# Patient Record
Sex: FEMALE | Race: WHITE | NOT HISPANIC OR LATINO | Employment: OTHER | URBAN - NONMETROPOLITAN AREA
[De-identification: names, ages, dates, MRNs, and addresses within clinical notes are randomized per-mention and may not be internally consistent; named-entity substitution may affect disease eponyms.]

---

## 2021-04-08 DIAGNOSIS — Z23 ENCOUNTER FOR IMMUNIZATION: ICD-10-CM

## 2024-03-18 ENCOUNTER — EVALUATION (OUTPATIENT)
Dept: PHYSICAL THERAPY | Facility: CLINIC | Age: 74
End: 2024-03-18
Payer: MEDICARE

## 2024-03-18 DIAGNOSIS — M54.16 LUMBAR RADICULOPATHY: Primary | ICD-10-CM

## 2024-03-18 DIAGNOSIS — M53.3 SACROILIAC JOINT DYSFUNCTION: ICD-10-CM

## 2024-03-18 PROCEDURE — 97161 PT EVAL LOW COMPLEX 20 MIN: CPT | Performed by: PHYSICAL THERAPIST

## 2024-03-18 PROCEDURE — 97110 THERAPEUTIC EXERCISES: CPT | Performed by: PHYSICAL THERAPIST

## 2024-03-18 NOTE — LETTER
2024    Reed Mckinney MD  [address] [place] [id number]    Patient: Karen Gambino   YOB: 1950   Date of Visit: 3/18/2024     Encounter Diagnosis     ICD-10-CM    1. Lumbar radiculopathy  M54.16       2. Sacroiliac joint dysfunction  M53.3           Dear Dr. Mckinney:    Thank you for your recent referral of Karen Gambino. Please review the attached evaluation summary from Karen's recent visit.     Please verify that you agree with the plan of care by signing the attached order.     If you have any questions or concerns, please do not hesitate to call.     I sincerely appreciate the opportunity to share in the care of one of your patients and hope to have another opportunity to work with you in the near future.       Sincerely,    Tracey Elliott, PT      Referring Provider:      I certify that I have read the below Plan of Care and certify the need for these services furnished under this plan of treatment while under my care.                    Reed Mckinney MD  [address] [place] [id number]  Via Fax: [phone]          PT Evaluation     Today's date: 3/18/2024  Patient name: Karen Gambino  : 1950  MRN: [id number]  Referring provider: Reed Mckinney MD  Dx:   Encounter Diagnosis     ICD-10-CM    1. Lumbar radiculopathy  M54.16       2. Sacroiliac joint dysfunction  M53.3                      Assessment  Assessment details: Patient is a 73 y.o. woman who presents to PT with R LBP that radiates to the level of the knee. Patient reports tenderness with palpation to the right piriformis, SI joint and gluteus medius. Patient is able to ambulate with short step length and limited trunk rotation/ arm swing. Patient has limited trunk flexion ROM. Patient is expected to respond well to PT intervention to decrease pain and improve function.   Impairments: abnormal or restricted ROM, lacks appropriate home exercise program and pain with  function    Symptom irritability: highUnderstanding of Dx/Px/POC: excellent  Goals  STG - 4 weeks  Patient able to demonstrate increased L/S AROM into flexion for pain free ROM.  Patient to report pain at worst 2/10 with daily activity.  Patient to have increased flexibility of right piriformis by 25 %.  Patient to be independent with HEP.    LTG - 8 weeks  Patient able to demonstrate painfree AROM fo the L/S.  Patient able to demonstrate good posture in sitting and standing.  Patient able to demonstrate proper body mechanics for lifting and carrying items.  Patient able to ambulate with normalized gait pattern.   Patient to demonstrate core strength at  4+/5 .  Patient to be independent with final HEP.      Plan  Plan details: Patient's evaluation is completed. Patient was instructed with a HEP this date. Patient has scheduled further PT sessions for treatment.    Patient would benefit from: PT eval and skilled physical therapy  Planned modality interventions: TENS, thermotherapy: hydrocollator packs, electrical stimulation/Russian stimulation and cryotherapy  Planned therapy interventions: manual therapy, neuromuscular re-education, therapeutic exercise, therapeutic training and home exercise program  Frequency: 2x week  Duration in weeks: 2  Plan of Care beginning date: 3/18/2024  Plan of Care expiration date: 5/13/2024  Treatment plan discussed with: patient        Subjective Evaluation    History of Present Illness  Mechanism of injury: Patient notes that she has trouble at the right SI joint/ buttock with radiation/ tingling to the knee. She notes that she has had symptoms for about 2 months. She notes no prior hx of this type of symptoms. She notes she had an injection for tail bone pain that was helpful in November 2023. She notes difficulty laying in bed. Symptoms increase with sitting. She notes that she feels better with standing/ walking.   Patient Goals  Patient goals for therapy: decreased  pain    Pain  Current pain ratin  At best pain ratin  At worst pain ratin  Quality: dull ache  Relieving factors: change in position  Aggravating factors: sitting  Progression: no change    Social Support  Lives in: multiple-level home    Employment status: working ( as needed - sitting/ walking)  Treatments  Previous treatment: physical therapy (for the neck)        Objective     Postural Observations  Seated posture: fair  Standing posture: fair      Palpation   Left   Hypertonic in the erector spinae.   Tenderness of the lumbar paraspinals and quadratus lumborum.     Right   Hypertonic in the erector spinae.   Tenderness of the lumbar paraspinals and quadratus lumborum.     Additional Palpation Details  R and L SI joint are reactive to palpation as well at the R piriformis and Gluteus medius    Neurological Testing     Sensation     Lumbar   Left   Intact: light touch    Right   Intact: light touch    Reflexes   Left   Patellar (L4): normal (2+)    Right   Patellar (L4): normal (2+)    Active Range of Motion     Lumbar   Flexion:  with pain Restriction level: moderate  Extension:  WFL  Left lateral flexion:  with pain Restriction level: minimal  Right lateral flexion:  Restriction level: minimal  Left rotation:  Restriction level: minimal  Right rotation:  Restriction level: minimal    Joint Play     Hypomobile: L3, L4 and L5     Pain: L3, L4 and L5     Strength/Myotome Testing     Left Hip   Planes of Motion   Flexion: 4+  Extension: 4+  Abduction: 4+  Adduction: 4+  External rotation: 4  Internal rotation: 4    Right Hip   Planes of Motion   Flexion: 4-  Extension: 4+  Abduction: 4-  Adduction: 4+  External rotation: 4-  Internal rotation: 4    Left Knee   Flexion: 4+  Extension: 4+    Right Knee   Flexion: 4+  Extension: 4+    Left Ankle/Foot   Dorsiflexion: 4+  Plantar flexion: 4+    Right Ankle/Foot   Dorsiflexion: 4+  Plantar flexion: 4+    Functional Assessment      Squat   "  Trunk lean left.              Precautions: none     Daily Treatment Diary:      Initial Evaluation Date: 03/18/24  Compliance 3/18                     Visit Number 1                    Re-Eval  IE                 MC   Foto Captured Y                           3/18                     Manual                      TPR - R piriformis/ glut med  -->                                                                 Ther-Ex                      Piriformis stretch 30\" x3                     Sktc 10\" x3                     LTR -->                     HS stretch 30\" x3                     TA setting 5\" x10                                           Seated flexion ball roll outs                                                                                        Neuro Re-Ed                                                                                                Ther-Act                                                               Modalities                      HP prone  10 min                                                    Access Code: [email or web address]  URL: [email or web address]  Date: 03/18/2024  Prepared by: Tracey Elliott    Exercises  - Hooklying Single Knee to Chest Stretch  - 1 x daily - 7 x weekly - 1 sets - 3 reps - 10 hold  - Supine Piriformis Stretch with Foot on Ground  - 1 x daily - 7 x weekly - 1 sets - 3 reps - 30 hold  - Seated Hamstring Stretch  - 1 x daily - 7 x weekly - 1 sets - 3 reps - 30 hold  - Supine Transversus Abdominis Bracing - Hands on Ground  - 1 x daily - 7 x weekly - 1 sets - 10 reps - 3-5 hold                         "

## 2024-03-18 NOTE — PROGRESS NOTES
PT Evaluation     Today's date: 3/18/2024  Patient name: Karen Gambino  : 1950  MRN: [id number]  Referring provider: Reed Mckinney MD  Dx:   Encounter Diagnosis     ICD-10-CM    1. Lumbar radiculopathy  M54.16       2. Sacroiliac joint dysfunction  M53.3                      Assessment  Assessment details: Patient is a 73 y.o. woman who presents to PT with R LBP that radiates to the level of the knee. Patient reports tenderness with palpation to the right piriformis, SI joint and gluteus medius. Patient is able to ambulate with short step length and limited trunk rotation/ arm swing. Patient has limited trunk flexion ROM. Patient is expected to respond well to PT intervention to decrease pain and improve function.   Impairments: abnormal or restricted ROM, lacks appropriate home exercise program and pain with function    Symptom irritability: highUnderstanding of Dx/Px/POC: excellent  Goals  STG - 4 weeks  Patient able to demonstrate increased L/S AROM into flexion for pain free ROM.  Patient to report pain at worst 2/10 with daily activity.  Patient to have increased flexibility of right piriformis by 25 %.  Patient to be independent with HEP.    LTG - 8 weeks  Patient able to demonstrate painfree AROM fo the L/S.  Patient able to demonstrate good posture in sitting and standing.  Patient able to demonstrate proper body mechanics for lifting and carrying items.  Patient able to ambulate with normalized gait pattern.   Patient to demonstrate core strength at  4+/5 .  Patient to be independent with final HEP.      Plan  Plan details: Patient's evaluation is completed. Patient was instructed with a HEP this date. Patient has scheduled further PT sessions for treatment.    Patient would benefit from: PT eval and skilled physical therapy  Planned modality interventions: TENS, thermotherapy: hydrocollator packs, electrical stimulation/Russian stimulation and cryotherapy  Planned therapy interventions: manual  therapy, neuromuscular re-education, therapeutic exercise, therapeutic training and home exercise program  Frequency: 2x week  Duration in weeks: 2  Plan of Care beginning date: 3/18/2024  Plan of Care expiration date: 2024  Treatment plan discussed with: patient        Subjective Evaluation    History of Present Illness  Mechanism of injury: Patient notes that she has trouble at the right SI joint/ buttock with radiation/ tingling to the knee. She notes that she has had symptoms for about 2 months. She notes no prior hx of this type of symptoms. She notes she had an injection for tail bone pain that was helpful in 2023. She notes difficulty laying in bed. Symptoms increase with sitting. She notes that she feels better with standing/ walking.   Patient Goals  Patient goals for therapy: decreased pain    Pain  Current pain ratin  At best pain ratin  At worst pain ratin  Quality: dull ache  Relieving factors: change in position  Aggravating factors: sitting  Progression: no change    Social Support  Lives in: multiple-level home    Employment status: working ( as needed - sitting/ walking)  Treatments  Previous treatment: physical therapy (for the neck)        Objective     Postural Observations  Seated posture: fair  Standing posture: fair      Palpation   Left   Hypertonic in the erector spinae.   Tenderness of the lumbar paraspinals and quadratus lumborum.     Right   Hypertonic in the erector spinae.   Tenderness of the lumbar paraspinals and quadratus lumborum.     Additional Palpation Details  R and L SI joint are reactive to palpation as well at the R piriformis and Gluteus medius    Neurological Testing     Sensation     Lumbar   Left   Intact: light touch    Right   Intact: light touch    Reflexes   Left   Patellar (L4): normal (2+)    Right   Patellar (L4): normal (2+)    Active Range of Motion     Lumbar   Flexion:  with pain Restriction level: moderate  Extension:   "WFL  Left lateral flexion:  with pain Restriction level: minimal  Right lateral flexion:  Restriction level: minimal  Left rotation:  Restriction level: minimal  Right rotation:  Restriction level: minimal    Joint Play     Hypomobile: L3, L4 and L5     Pain: L3, L4 and L5     Strength/Myotome Testing     Left Hip   Planes of Motion   Flexion: 4+  Extension: 4+  Abduction: 4+  Adduction: 4+  External rotation: 4  Internal rotation: 4    Right Hip   Planes of Motion   Flexion: 4-  Extension: 4+  Abduction: 4-  Adduction: 4+  External rotation: 4-  Internal rotation: 4    Left Knee   Flexion: 4+  Extension: 4+    Right Knee   Flexion: 4+  Extension: 4+    Left Ankle/Foot   Dorsiflexion: 4+  Plantar flexion: 4+    Right Ankle/Foot   Dorsiflexion: 4+  Plantar flexion: 4+    Functional Assessment      Squat    Trunk lean left.              Precautions: none     Daily Treatment Diary:      Initial Evaluation Date: 03/18/24  Compliance 3/18                     Visit Number 1                    Re-Eval  IE                    Foto Captured Y                           3/18                     Manual                      TPR - R piriformis/ glut med  -->                                                                 Ther-Ex                      Piriformis stretch 30\" x3                     Sktc 10\" x3                     LTR -->                     HS stretch 30\" x3                     TA setting 5\" x10                                           Seated flexion ball roll outs                                                                                        Neuro Re-Ed                                                                                                Ther-Act                                                               Modalities                      HP prone  10 min                                                    Access Code: [email or web address]  URL: [email or web address]  Date: 03/18/2024  Prepared " by: Tracey Elliott    Exercises  - Hooklying Single Knee to Chest Stretch  - 1 x daily - 7 x weekly - 1 sets - 3 reps - 10 hold  - Supine Piriformis Stretch with Foot on Ground  - 1 x daily - 7 x weekly - 1 sets - 3 reps - 30 hold  - Seated Hamstring Stretch  - 1 x daily - 7 x weekly - 1 sets - 3 reps - 30 hold  - Supine Transversus Abdominis Bracing - Hands on Ground  - 1 x daily - 7 x weekly - 1 sets - 10 reps - 3-5 hold

## 2024-03-18 NOTE — LETTER
2024    Reed Mckinney MD  [address] [place] [address]    Patient: Karen Gambino   YOB: 1950   Date of Visit: 3/18/2024     Encounter Diagnosis     ICD-10-CM    1. Lumbar radiculopathy  M54.16       2. Sacroiliac joint dysfunction  M53.3           Dear Dr. Mckinney:    Thank you for your recent referral of Karen Gambino. Please review the attached evaluation summary from Karen's recent visit.     Please verify that you agree with the plan of care by signing the attached order.     If you have any questions or concerns, please do not hesitate to call.     I sincerely appreciate the opportunity to share in the care of one of your patients and hope to have another opportunity to work with you in the near future.       Sincerely,    Tracey Elliott, PT      Referring Provider:      I certify that I have read the below Plan of Care and certify the need for these services furnished under this plan of treatment while under my care.                    Reed Mckinney MD  [address] [place] [address]  Via Fax: [phone]          PT Evaluation     Today's date: 3/18/2024  Patient name: Karen Gambino  : 1950  MRN: [id number]  Referring provider: Reed Mckinney MD  Dx: No diagnosis found.               Assessment/Plan    Subjective Evaluation    History of Present Illness  Mechanism of injury: Patient notes that she has trouble at the right SI joint/ buttock with radiation/ tingling to the knee. She notes that she has had symptoms for about 2 months. She notes no prior hx of this type of symptoms. She notes she had an injection for tail bone pain that was helpful in 2023. She notes difficulty laying in bed. Symptoms increase with sitting. She notes that she feels better with standing/ walking.   Patient Goals  Patient goals for therapy: decreased pain    Pain  Current pain ratin  At best pain ratin  At worst pain ratin  Quality:  dull ache  Relieving factors: change in position  Aggravating factors: sitting  Progression: no change    Social Support  Lives in: multiple-level home    Employment status: working ( as needed - sitting/ walking)  Treatments  Previous treatment: physical therapy (for the neck)      Objective           Precautions: ***     Daily Treatment Diary:      Initial Evaluation Date: 03/18/24  Compliance ***                     Visit Number                     Re-Eval  IE                    Foto Captured                            ***                     Manual                                                                                        Ther-Ex                                                                                                                                                                                                                                                  Neuro Re-Ed                                                                                                Ther-Act                                                               Modalities                                                                           Access Code: [email or web address]  URL: [email or web address]  Date: 03/18/2024  Prepared by: Tracey Elliott    Exercises  - Hooklying Single Knee to Chest Stretch  - 1 x daily - 7 x weekly - 1 sets - 3 reps - 10 hold  - Supine Piriformis Stretch with Foot on Ground  - 1 x daily - 7 x weekly - 1 sets - 3 reps - 30 hold  - Seated Hamstring Stretch  - 1 x daily - 7 x weekly - 1 sets - 3 reps - 30 hold  - Supine Transversus Abdominis Bracing - Hands on Ground  - 1 x daily - 7 x weekly - 1 sets - 10 reps - 3-5 hold

## 2024-03-21 ENCOUNTER — OFFICE VISIT (OUTPATIENT)
Dept: PHYSICAL THERAPY | Facility: CLINIC | Age: 74
End: 2024-03-21
Payer: MEDICARE

## 2024-03-21 DIAGNOSIS — M54.16 LUMBAR RADICULOPATHY: Primary | ICD-10-CM

## 2024-03-21 DIAGNOSIS — M53.3 SACROILIAC JOINT DYSFUNCTION: ICD-10-CM

## 2024-03-21 PROCEDURE — 97110 THERAPEUTIC EXERCISES: CPT

## 2024-03-21 PROCEDURE — 97112 NEUROMUSCULAR REEDUCATION: CPT

## 2024-03-21 PROCEDURE — 97140 MANUAL THERAPY 1/> REGIONS: CPT

## 2024-03-21 NOTE — PROGRESS NOTES
"Daily Note     Today's date: 3/21/2024  Patient name: Karen Gambino  : 1950  MRN: [id number]  Referring provider: Reed Mckinney MD  Dx:   Encounter Diagnosis     ICD-10-CM    1. Lumbar radiculopathy  M54.16       2. Sacroiliac joint dysfunction  M53.3                      Subjective: Patient reports R sided low back and LE symptoms remain about the same. She says she been complaint with HEP.      Objective: See treatment diary below      Assessment: Tolerated treatment well without complaint. Patient required moderate verbal cues to perform exercises with appropriate technique and intensity. Patient would benefit from continued PT to increase trunk mobility and core strength for improved function in daily activities.       Plan: Continue per plan of care.      Precautions: none     Daily Treatment Diary:      Initial Evaluation Date: 24  Compliance 3/18  3/21                   Visit Number 1 2                   Re-Eval  IE                    Foto Captured Y                           3/18  3/21                   Manual                      TPR - R piriformis/ glut med  -->  15 min                                                               Ther-Ex                      Piriformis stretch 30\" x3 30\"x3                   Sktc 10\" x3 10\"x5                   LTR --> 5\"x10                   HS stretch 30\" x3  30\"x3 strap                                                               Seated flexion ball roll outs- 3 dir   5\"x5 ea                                                                                     Neuro Re-Ed                      TA setting 5\"x10  5\"x10                   TA c hip abd  5\"x10 OTB           TA c hip add  5\"x10           TA c march  10x                        Ther-Act                                                               Modalities                      HP prone  10 min 10 min pre                                                   Access Code: [email or web address]  URL: " [email or web address]  Date: 03/18/2024  Prepared by: Tracey Elliott    Exercises  - Hooklying Single Knee to Chest Stretch  - 1 x daily - 7 x weekly - 1 sets - 3 reps - 10 hold  - Supine Piriformis Stretch with Foot on Ground  - 1 x daily - 7 x weekly - 1 sets - 3 reps - 30 hold  - Seated Hamstring Stretch  - 1 x daily - 7 x weekly - 1 sets - 3 reps - 30 hold  - Supine Transversus Abdominis Bracing - Hands on Ground  - 1 x daily - 7 x weekly - 1 sets - 10 reps - 3-5 hold

## 2024-03-25 ENCOUNTER — OFFICE VISIT (OUTPATIENT)
Dept: PHYSICAL THERAPY | Facility: CLINIC | Age: 74
End: 2024-03-25
Payer: MEDICARE

## 2024-03-25 DIAGNOSIS — M54.16 LUMBAR RADICULOPATHY: Primary | ICD-10-CM

## 2024-03-25 DIAGNOSIS — M53.3 SACROILIAC JOINT DYSFUNCTION: ICD-10-CM

## 2024-03-25 PROCEDURE — 97140 MANUAL THERAPY 1/> REGIONS: CPT

## 2024-03-25 PROCEDURE — 97110 THERAPEUTIC EXERCISES: CPT

## 2024-03-25 PROCEDURE — 97112 NEUROMUSCULAR REEDUCATION: CPT

## 2024-03-25 NOTE — PROGRESS NOTES
"Daily Note     Today's date: 3/25/2024  Patient name: Karen Gambino  : 1950  MRN: [id number]  Referring provider: Reed Mckinney MD  Dx:   Encounter Diagnosis     ICD-10-CM    1. Lumbar radiculopathy  M54.16       2. Sacroiliac joint dysfunction  M53.3                      Subjective: She reports some pain and stiffness this weekend with the long drive to her daughter's house. Notes she felt good after PT with some mild soreness.      Objective: See treatment diary below      Assessment: Tolerated treatment well without complaint. Moderate R sided L/S paraspinal and glute hypertonicity noted with STM. Patient would benefit from continued PT to increase trunk mobility and core strength for improved function in ADLs.      Plan: Continue per plan of care.      Precautions: none     Daily Treatment Diary:      Initial Evaluation Date: 24  Compliance 3/18  3/21  3/25                 Visit Number 1 2  3                 Re-Eval  IE                 MC   Foto Captured Y                           3/18  3/21  3/25                 Manual                      TPR - R piriformis/ glut med  -->  15 min  15 min                                                             Ther-Ex                      Warm up   Heat below          Piriformis stretch 30\" x3 30\"x3  30\"x3                 Sktc 10\" x3 10\"x5  10\"x5                 LTR --> 5\"x10  5\"x10                 HS stretch 30\" x3  30\"x3 strap  30\"x3 strap                                                             Seated flexion ball roll outs- 3 dir   5\"x5 ea  5\"x5 ea                                                                                   Neuro Re-Ed                      TA setting 5\"x10  5\"x10  5\"x10                 TA c hip abd  5\"x10 OTB 5\"x15 GTB          TA c hip add  5\"x10 5\"x15          TA c march  10x 15x                       Ther-Act                                                               Modalities                      HP prone  10 min 10 min " pre 10 min pre                                                  Access Code: [email or web address]  URL: [email or web address]  Date: 03/18/2024  Prepared by: Tracey Elliott    Exercises  - Hooklying Single Knee to Chest Stretch  - 1 x daily - 7 x weekly - 1 sets - 3 reps - 10 hold  - Supine Piriformis Stretch with Foot on Ground  - 1 x daily - 7 x weekly - 1 sets - 3 reps - 30 hold  - Seated Hamstring Stretch  - 1 x daily - 7 x weekly - 1 sets - 3 reps - 30 hold  - Supine Transversus Abdominis Bracing - Hands on Ground  - 1 x daily - 7 x weekly - 1 sets - 10 reps - 3-5 hold

## 2024-03-28 ENCOUNTER — OFFICE VISIT (OUTPATIENT)
Dept: PHYSICAL THERAPY | Facility: CLINIC | Age: 74
End: 2024-03-28
Payer: MEDICARE

## 2024-03-28 DIAGNOSIS — M53.3 SACROILIAC JOINT DYSFUNCTION: ICD-10-CM

## 2024-03-28 DIAGNOSIS — M54.16 LUMBAR RADICULOPATHY: Primary | ICD-10-CM

## 2024-03-28 PROCEDURE — 97140 MANUAL THERAPY 1/> REGIONS: CPT

## 2024-03-28 PROCEDURE — 97110 THERAPEUTIC EXERCISES: CPT

## 2024-03-28 PROCEDURE — 97112 NEUROMUSCULAR REEDUCATION: CPT

## 2024-03-28 NOTE — PROGRESS NOTES
"Daily Note     Today's date: 3/28/2024  Patient name: Karen Gambino  : 1950  MRN: [id number]  Referring provider: Reed Mckinney MD  Dx:   Encounter Diagnosis     ICD-10-CM    1. Lumbar radiculopathy  M54.16       2. Sacroiliac joint dysfunction  M53.3                      Subjective: Patient reports low back is not as sore today as previously.       Objective: See treatment diary below      Assessment: Tolerated treatment well without complaint. Patient demonstrates better understanding of core engagement work. Patient would benefit from continued PT to increase to increase trunk mobility and core strength for improved function in daily activities.      Plan: Continue per plan of care.      Precautions: none     Daily Treatment Diary:      Initial Evaluation Date: 24  Compliance 3/18  3/21  3/25  3/28               Visit Number 1 2  3  4               Re-Eval  IE                 MC   Foto Captured Y                           3/18  3/21  3/25  3/28               Manual                      TPR - R piriformis/ glut med  -->  15 min  15 min  12 min                                                           Ther-Ex                      Warm up   Heat below          Piriformis stretch 30\" x3 30\"x3  30\"x3  30\"x3               Sktc 10\" x3 10\"x5  10\"x5  10\"x5               LTR --> 5\"x10  5\"x10  5\"X10               HS stretch 30\" x3  30\"x3 strap  30\"x3 strap  30\"x3 strap                                                           Seated flexion ball roll outs- 3 dir   5\"x5 ea  5\"x5 ea  5\"x10 ea                                     NuStep      L1 5 min                                     Neuro Re-Ed                      TA setting 5\"x10  5\"x10  5\"x10  5\"x15               TA c hip abd  5\"x10 OTB 5\"x15 GTB 5\"X15 GTB         TA c hip add  5\"x10 5\"x15 5\"x15         TA c  15x 15x                      Ther-Act                                                               Modalities                      HP " prone  10 min 10 min pre 10 min pre 10 min post                                                 Access Code: [email or web address]  URL: [email or web address]  Date: 03/18/2024  Prepared by: Tracey Elliott    Exercises  - Hooklying Single Knee to Chest Stretch  - 1 x daily - 7 x weekly - 1 sets - 3 reps - 10 hold  - Supine Piriformis Stretch with Foot on Ground  - 1 x daily - 7 x weekly - 1 sets - 3 reps - 30 hold  - Seated Hamstring Stretch  - 1 x daily - 7 x weekly - 1 sets - 3 reps - 30 hold  - Supine Transversus Abdominis Bracing - Hands on Ground  - 1 x daily - 7 x weekly - 1 sets - 10 reps - 3-5 hold

## 2024-04-05 ENCOUNTER — OFFICE VISIT (OUTPATIENT)
Dept: PHYSICAL THERAPY | Facility: CLINIC | Age: 74
End: 2024-04-05
Payer: MEDICARE

## 2024-04-05 DIAGNOSIS — M53.3 SACROILIAC JOINT DYSFUNCTION: ICD-10-CM

## 2024-04-05 DIAGNOSIS — M54.16 LUMBAR RADICULOPATHY: Primary | ICD-10-CM

## 2024-04-05 PROCEDURE — 97110 THERAPEUTIC EXERCISES: CPT

## 2024-04-05 PROCEDURE — 97140 MANUAL THERAPY 1/> REGIONS: CPT

## 2024-04-05 NOTE — PROGRESS NOTES
"Daily Note     Today's date: 2024  Patient name: Karen Gambino  : 1950  MRN: [id number]  Referring provider: Reed Mckinney MD  Dx:   Encounter Diagnosis     ICD-10-CM    1. Lumbar radiculopathy  M54.16       2. Sacroiliac joint dysfunction  M53.3                      Subjective: Pt reports continued tenderness in her R posterior hip glue/piriformis area.      Objective: See treatment diary below      Assessment: Tolerated treatment well. Patient exhibited good technique with therapeutic exercises. She had more trigger point tenderness in the R piriformis compared to glute med today.        Plan: Continue per plan of care.      Precautions: none     Daily Treatment Diary:      Initial Evaluation Date: 24  Compliance 3/18  3/21  3/25  3/28  4/5/24             Visit Number 1 2  3  4  5             Re-Eval  IE                 MC   Foto Captured Y                           3/18  3/21  3/25  3/28  4/5/24             Manual                      TPR - R piriformis/ glut med  -->  15 min  15 min  12 min  15 min                                                         Ther-Ex                      Warm up   Heat below          Piriformis stretch 30\" x3 30\"x3  30\"x3  30\"x3  30\"x3             Sktc 10\" x3 10\"x5  10\"x5  10\"x5  10\"x5             LTR --> 5\"x10  5\"x10  5\"X10  5\"10             HS stretch 30\" x3  30\"x3 strap  30\"x3 strap  30\"x3 strap  30'x3 strap                                                         Seated flexion ball roll outs- 3 dir   5\"x5 ea  5\"x5 ea  5\"x10 ea  5\"x10                                   NuStep      L1 5 min  L1 5 min                                   Neuro Re-Ed                      TA setting 5\"x10  5\"x10  5\"x10  5\"x15  5\"x15             TA c hip abd  5\"x10 OTB 5\"x15 GTB 5\"X15 GTB 5\"x15 GTB        TA c hip add  5\"x10 5\"x15 5\"x15 5\"x15        TA c march  10x 15x 15x 15x                     Ther-Act                                                               Modalities            "           HP prone  10 min 10 min pre 10 min pre 10 min post 10 min pre                                                Access Code: [email or web address]  URL: [email or web address]  Date: 03/18/2024  Prepared by: Tracey Elliott    Exercises  - Hooklying Single Knee to Chest Stretch  - 1 x daily - 7 x weekly - 1 sets - 3 reps - 10 hold  - Supine Piriformis Stretch with Foot on Ground  - 1 x daily - 7 x weekly - 1 sets - 3 reps - 30 hold  - Seated Hamstring Stretch  - 1 x daily - 7 x weekly - 1 sets - 3 reps - 30 hold  - Supine Transversus Abdominis Bracing - Hands on Ground  - 1 x daily - 7 x weekly - 1 sets - 10 reps - 3-5 hold

## 2024-04-08 ENCOUNTER — OFFICE VISIT (OUTPATIENT)
Dept: PHYSICAL THERAPY | Facility: CLINIC | Age: 74
End: 2024-04-08
Payer: MEDICARE

## 2024-04-08 DIAGNOSIS — M53.3 SACROILIAC JOINT DYSFUNCTION: ICD-10-CM

## 2024-04-08 DIAGNOSIS — M54.16 LUMBAR RADICULOPATHY: Primary | ICD-10-CM

## 2024-04-08 PROCEDURE — 97140 MANUAL THERAPY 1/> REGIONS: CPT

## 2024-04-08 PROCEDURE — 97110 THERAPEUTIC EXERCISES: CPT

## 2024-04-08 PROCEDURE — 97112 NEUROMUSCULAR REEDUCATION: CPT

## 2024-04-08 NOTE — PROGRESS NOTES
"Daily Note     Today's date: 2024  Patient name: Karen Gambino  : 1950  MRN: [id number]  Referring provider: Reed Mckinney MD  Dx:   Encounter Diagnosis     ICD-10-CM    1. Lumbar radiculopathy  M54.16       2. Sacroiliac joint dysfunction  M53.3                      Subjective: Patient reports some mild soreness in low back today.      Objective: See treatment diary below      Assessment: Tolerated treatment well without complaint. Patient required moderate verbal cues to perform exercises with appropriate technique and intensity. Patient would benefit from continued PT to increase trunk mobility and core strength for improved function in daily activities.       Plan: Continue per plan of care.      Precautions: none     Daily Treatment Diary:      Initial Evaluation Date: 24  Compliance 3/18  3/21  3/25  3/28  4/5/24 4/8           Visit Number 1 2  3  4  5  6           Re-Eval  IE                 MC   Foto Captured Y                           3/18  3/21  3/25  3/28  4/5/24  4/8           Manual                      TPR - R piriformis/ glut med  -->  15 min  15 min  12 min  15 min 15min                                                       Ther-Ex                      Warm up   Heat below          Piriformis stretch 30\" x3 30\"x3  30\"x3  30\"x3  30\"x3  30\"x3           Sktc 10\" x3 10\"x5  10\"x5  10\"x5  10\"x5  5\"x10           LTR --> 5\"x10  5\"x10  5\"X10  5\"10  5\"x10           HS stretch 30\" x3  30\"x3 strap  30\"x3 strap  30\"x3 strap  30'x3 strap 30'x3 strap                                                        Seated flexion ball roll outs- 3 dir   5\"x5 ea  5\"x5 ea  5\"x10 ea  5\"x10  5\"x10                                 NuStep      L1 5 min  L1 5 min L3 7 min                                 Neuro Re-Ed                      TA setting 5\"x10  5\"x10  5\"x10  5\"x15  5\"x15  5\"x20           TA c hip abd  5\"x10 OTB 5\"x15 GTB 5\"X15 GTB 5\"x15 GTB GTB 5\"x20       TA c hip add  5\"x10 5\"x15 5\"x15 5\"x15 5\"x20     "   TA c march  10x 15x 15x 15x 20x                    Ther-Act                                                               Modalities                      HP prone  10 min 10 min pre 10 min pre 10 min post 10 min pre 10 min post                                               Access Code: [email or web address]  URL: [email or web address]  Date: 03/18/2024  Prepared by: Tracey Elliott    Exercises  - Hooklying Single Knee to Chest Stretch  - 1 x daily - 7 x weekly - 1 sets - 3 reps - 10 hold  - Supine Piriformis Stretch with Foot on Ground  - 1 x daily - 7 x weekly - 1 sets - 3 reps - 30 hold  - Seated Hamstring Stretch  - 1 x daily - 7 x weekly - 1 sets - 3 reps - 30 hold  - Supine Transversus Abdominis Bracing - Hands on Ground  - 1 x daily - 7 x weekly - 1 sets - 10 reps - 3-5 hold

## 2024-04-11 ENCOUNTER — OFFICE VISIT (OUTPATIENT)
Dept: PHYSICAL THERAPY | Facility: CLINIC | Age: 74
End: 2024-04-11
Payer: MEDICARE

## 2024-04-11 DIAGNOSIS — M53.3 SACROILIAC JOINT DYSFUNCTION: Primary | ICD-10-CM

## 2024-04-11 DIAGNOSIS — M54.16 LUMBAR RADICULOPATHY: ICD-10-CM

## 2024-04-11 PROCEDURE — 97112 NEUROMUSCULAR REEDUCATION: CPT

## 2024-04-11 PROCEDURE — 97110 THERAPEUTIC EXERCISES: CPT

## 2024-04-11 PROCEDURE — 97140 MANUAL THERAPY 1/> REGIONS: CPT

## 2024-04-11 NOTE — PROGRESS NOTES
"Daily Note     Today's date: 2024  Patient name: Karen Gambino  : 1950  MRN: [id number]  Referring provider: Reed Mckinney MD  Dx:   Encounter Diagnosis     ICD-10-CM    1. Sacroiliac joint dysfunction  M53.3       2. Lumbar radiculopathy  M54.16                      Subjective: Patient reports decreased severity in symptoms since beginning PT. She notes she still experiences pain mostly in evenings. She says she can still feel where it is tight, but not as bad.       Objective: See treatment diary below      Assessment: Tolerated treatment well without complaint. R sided hypertonicity continues over R PSIS and into R piriformis. Patient tolerated core progressions well and demonstrated moderate challenge especially with bird dog. Patient would benefit from continued PT to increase trunk mobility and core strength for improved function in ADLs.      Plan: Continue per plan of care.      Precautions: none     Daily Treatment Diary:      Initial Evaluation Date: 24  Compliance 3/18  3/21  3/25  3/28  4/5/24 4/8  4/11         Visit Number 1 2  3  4  5  6  7         Re-Eval  IE                 MC   Foto Captured Y           Y                3/18  3/21  3/25  3/28  4/5/24  4/8  4/11         Manual                      TPR - R piriformis/ glut med  -->  15 min  15 min  12 min  15 min 15min 15 min                                                     Ther-Ex                      Warm up   Heat below          Piriformis stretch 30\" x3 30\"x3  30\"x3  30\"x3  30\"x3  30\"x3  30\"x3          Sktc 10\" x3 10\"x5  10\"x5  10\"x5  10\"x5  5\"x10  5\"x10         LTR --> 5\"x10  5\"x10  5\"X10  5\"10  5\"x10  5\"x10         HS stretch 30\" x3  30\"x3 strap  30\"x3 strap  30\"x3 strap  30'x3 strap 30'x3 strap   30\"x3 strap                                                     Seated flexion ball roll outs- 3 dir   5\"x5 ea  5\"x5 ea  5\"x10 ea  5\"x10  5\"x10  5\"x10                               NuStep      L1 5 min  L1 5 min L3 7 min  L3 10 " "min                               Neuro Re-Ed                      TA setting 5\"x10  5\"x10  5\"x10  5\"x15  5\"x15  5\"x20  5\"x20         TA c hip abd  5\"x10 OTB 5\"x15 GTB 5\"X15 GTB 5\"x15 GTB GTB 5\"x20 BTB 5\"x20      TA c hip add  5\"x10 5\"x15 5\"x15 5\"x15 5\"x20 5\"X20      TA c march  10x 15x 15x 15x 20x 20x      Dead bug       10x      Bird dog       8x                   Ther-Act                                                               Modalities                      HP prone  10 min 10 min pre 10 min pre 10 min post 10 min pre 10 min post Seated 10 min post                                              Access Code: [email or web address]  URL: [email or web address]  Date: 03/18/2024  Prepared by: Tracey Elliott    Exercises  - Hooklying Single Knee to Chest Stretch  - 1 x daily - 7 x weekly - 1 sets - 3 reps - 10 hold  - Supine Piriformis Stretch with Foot on Ground  - 1 x daily - 7 x weekly - 1 sets - 3 reps - 30 hold  - Seated Hamstring Stretch  - 1 x daily - 7 x weekly - 1 sets - 3 reps - 30 hold  - Supine Transversus Abdominis Bracing - Hands on Ground  - 1 x daily - 7 x weekly - 1 sets - 10 reps - 3-5 hold                   "

## 2024-04-15 ENCOUNTER — OFFICE VISIT (OUTPATIENT)
Dept: PHYSICAL THERAPY | Facility: CLINIC | Age: 74
End: 2024-04-15
Payer: MEDICARE

## 2024-04-15 DIAGNOSIS — M54.16 LUMBAR RADICULOPATHY: ICD-10-CM

## 2024-04-15 DIAGNOSIS — M53.3 SACROILIAC JOINT DYSFUNCTION: Primary | ICD-10-CM

## 2024-04-15 PROCEDURE — 97110 THERAPEUTIC EXERCISES: CPT

## 2024-04-15 PROCEDURE — 97112 NEUROMUSCULAR REEDUCATION: CPT

## 2024-04-15 PROCEDURE — 97140 MANUAL THERAPY 1/> REGIONS: CPT

## 2024-04-15 NOTE — PROGRESS NOTES
"Daily Note     Today's date: 4/15/2024  Patient name: Karen Gambino  : 1950  MRN: [id number]  Referring provider: Reed Mckinney MD  Dx:   Encounter Diagnosis     ICD-10-CM    1. Sacroiliac joint dysfunction  M53.3       2. Lumbar radiculopathy  M54.16                      Subjective: Patient reports symptoms continue but are less severe since beginning PT.      Objective: See treatment diary below      Assessment: Tolerated treatment well without complaint. Moderate fatigue following bird dog. Patient would benefit from continued PT to increase trunk mobility and core strength for improved function in daily activities.       Plan: Continue per plan of care.      Precautions: none     Daily Treatment Diary:      Initial Evaluation Date: 24  Compliance 3/18  3/21  3/25  3/28  4/5/24 4/8  4/11  4/15       Visit Number 1 2  3  4  5  6  7  8       Re-Eval  IE                 MC   Foto Captured Y           Y                3/18  3/21  3/25  3/28  4/5/24  4/8  4/11  4/15       Manual                      TPR - R piriformis/ glut med  -->  15 min  15 min  12 min  15 min 15min 15 min 15 min                                                   Ther-Ex                      Warm up   Heat below          Piriformis stretch 30\" x3 30\"x3  30\"x3  30\"x3  30\"x3  30\"x3  30\"x3   30\"x3       Sktc 10\" x3 10\"x5  10\"x5  10\"x5  10\"x5  5\"x10  5\"x10  5\"x10       LTR --> 5\"x10  5\"x10  5\"X10  5\"10  5\"x10  5\"x10 5\"x10       HS stretch 30\" x3  30\"x3 strap  30\"x3 strap  30\"x3 strap  30'x3 strap 30'x3 strap   30\"x3 strap  30\"x3 strap                                                   Seated flexion ball roll outs- 3 dir   5\"x5 ea  5\"x5 ea  5\"x10 ea  5\"x10  5\"x10  5\"x10  5\"x10                             NuStep      L1 5 min  L1 5 min L3 7 min  L3 10 min L3 10 min                             Neuro Re-Ed                      TA setting 5\"x10  5\"x10  5\"x10  5\"x15  5\"x15  5\"x20  5\"x20  5\"x20       TA c hip abd  5\"x10 OTB 5\"x15 GTB 5\"X15 GTB " "5\"x15 GTB GTB 5\"x20 BTB 5\"x20 BTB 5\"x20     TA c hip add  5\"x10 5\"x15 5\"x15 5\"x15 5\"x20 5\"X20 5\"x20     TA c march  10x 15x 15x 15x 20x 20x 20x     Dead bug       10x 10x     Bird dog       8x 10x                  Ther-Act                                                               Modalities                      HP prone  10 min 10 min pre 10 min pre 10 min post 10 min pre 10 min post Seated 10 min post def                                             Access Code: [email or web address]  URL: [email or web address]  Date: 03/18/2024  Prepared by: Tracey Elliott    Exercises  - Hooklying Single Knee to Chest Stretch  - 1 x daily - 7 x weekly - 1 sets - 3 reps - 10 hold  - Supine Piriformis Stretch with Foot on Ground  - 1 x daily - 7 x weekly - 1 sets - 3 reps - 30 hold  - Seated Hamstring Stretch  - 1 x daily - 7 x weekly - 1 sets - 3 reps - 30 hold  - Supine Transversus Abdominis Bracing - Hands on Ground  - 1 x daily - 7 x weekly - 1 sets - 10 reps - 3-5 hold                     "

## 2024-04-18 ENCOUNTER — OFFICE VISIT (OUTPATIENT)
Dept: PHYSICAL THERAPY | Facility: CLINIC | Age: 74
End: 2024-04-18
Payer: MEDICARE

## 2024-04-18 DIAGNOSIS — M53.3 SACROILIAC JOINT DYSFUNCTION: Primary | ICD-10-CM

## 2024-04-18 DIAGNOSIS — M54.16 LUMBAR RADICULOPATHY: ICD-10-CM

## 2024-04-18 PROCEDURE — 97110 THERAPEUTIC EXERCISES: CPT

## 2024-04-18 PROCEDURE — 97112 NEUROMUSCULAR REEDUCATION: CPT

## 2024-04-18 PROCEDURE — 97140 MANUAL THERAPY 1/> REGIONS: CPT

## 2024-04-18 NOTE — PROGRESS NOTES
"Daily Note     Today's date: 2024  Patient name: Karen Gambino  : 1950  MRN: [id number]  Referring provider: Reed Mckinney MD  Dx:   Encounter Diagnosis     ICD-10-CM    1. Sacroiliac joint dysfunction  M53.3       2. Lumbar radiculopathy  M54.16                      Subjective: She notes she still has that tender spot. She notes sitting is worst for her. She notes minimal symptoms standing, walking, and sleeping.      Objective: See treatment diary below      Assessment: Tolerated treatment well without complaint. Patient required moderate verbal cues to perform exercises with appropriate technique and intensity. Patient would benefit from continued PT to increase trunk mobility and core strength for improved function in daily activities.       Plan: Continue per plan of care.      Precautions: none     Daily Treatment Diary:      Initial Evaluation Date: 24  Compliance 3/18  3/21  3/25  3/28  4/5/24 4/8  4/11  4/15  4/18     Visit Number 1 2  3  4  5  6  7  8  9     Re-Eval  IE                 [facility]           Y                3/18  3/21  3/25  3/28  4/5/24  4/8  4/11  4/15  4/18     Manual                      TPR - R piriformis/ glut med  -->  15 min  15 min  12 min  15 min 15min 15 min 15 min 15 min                                                 Ther-Ex                      Warm up   Heat below          Piriformis stretch 30\" x3 30\"x3  30\"x3  30\"x3  30\"x3  30\"x3  30\"x3   30\"x3  30\"x3     Sktc- no towel 10\" x3 10\"x5  10\"x5  10\"x5  10\"x5  5\"x10  5\"x10  5\"x10  5\"x10     LTR --> 5\"x10  5\"x10  5\"X10  5\"10  5\"x10  5\"x10 5\"x10  5\"x10     HS stretch 30\" x3  30\"x3 strap  30\"x3 strap  30\"x3 strap  30'x3 strap 30'x3 strap   30\"x3 strap  30\"x3 strap  30\"x3 strap                                                 Seated flexion ball roll outs- 3 dir   5\"x5 ea  5\"x5 ea  5\"x10 ea  5\"x10  5\"x10  5\"x10  5\"x10  5\"x10                           NuStep      L1 5 min  L1 5 min L3 7 min  L3 10 min L3 " "10 min  L3 10 min                           Neuro Re-Ed                      TA setting 5\"x10  5\"x10  5\"x10  5\"x15  5\"x15  5\"x20  5\"x20  5\"x20  -     TA c hip abd  5\"x10 OTB 5\"x15 GTB 5\"X15 GTB 5\"x15 GTB GTB 5\"x20 BTB 5\"x20 BTB 5\"x20 BTB 5\"x20    TA c hip add  5\"x10 5\"x15 5\"x15 5\"x15 5\"x20 5\"X20 5\"x20 5\"x20    TA c march  10x 15x 15x 15x 20x 20x 20x 20x    Dead bug       10x 10x 10x    Bird dog       8x 10x 10x    TA mini squat         15x    TA stand hip abd         15x ea                              Ther-Act                                                               Modalities                      HP prone  10 min 10 min pre 10 min pre 10 min post 10 min pre 10 min post Seated 10 min post def def                                            Access Code: [email or web address]  URL: [email or web address]  Date: 03/18/2024  Prepared by: Tracey Elliott    Exercises  - Hooklying Single Knee to Chest Stretch  - 1 x daily - 7 x weekly - 1 sets - 3 reps - 10 hold  - Supine Piriformis Stretch with Foot on Ground  - 1 x daily - 7 x weekly - 1 sets - 3 reps - 30 hold  - Seated Hamstring Stretch  - 1 x daily - 7 x weekly - 1 sets - 3 reps - 30 hold  - Supine Transversus Abdominis Bracing - Hands on Ground  - 1 x daily - 7 x weekly - 1 sets - 10 reps - 3-5 hold                       "

## 2024-04-22 ENCOUNTER — EVALUATION (OUTPATIENT)
Dept: PHYSICAL THERAPY | Facility: CLINIC | Age: 74
End: 2024-04-22
Payer: MEDICARE

## 2024-04-22 DIAGNOSIS — M54.16 LUMBAR RADICULOPATHY: ICD-10-CM

## 2024-04-22 DIAGNOSIS — M53.3 SACROILIAC JOINT DYSFUNCTION: Primary | ICD-10-CM

## 2024-04-22 PROCEDURE — 97140 MANUAL THERAPY 1/> REGIONS: CPT

## 2024-04-22 PROCEDURE — 97112 NEUROMUSCULAR REEDUCATION: CPT | Performed by: PHYSICAL THERAPIST

## 2024-04-22 PROCEDURE — 97110 THERAPEUTIC EXERCISES: CPT

## 2024-04-22 NOTE — LETTER
2024    Reed Mckinney MD  [address] [place] [id number]    Patient: Karen Gambino   YOB: 1950   Date of Visit: 2024     Encounter Diagnosis     ICD-10-CM    1. Sacroiliac joint dysfunction  M53.3       2. Lumbar radiculopathy  M54.16           Dear Dr. Mckinney:    Thank you for your recent referral of Karen Gambino. Please review the attached evaluation summary from Karen's recent visit.     Please verify that you agree with the plan of care by signing the attached order.     If you have any questions or concerns, please do not hesitate to call.     I sincerely appreciate the opportunity to share in the care of one of your patients and hope to have another opportunity to work with you in the near future.       Sincerely,    Tracey Elliott, PT      Referring Provider:      I certify that I have read the below Plan of Care and certify the need for these services furnished under this plan of treatment while under my care.                    Reed Mckinney MD  [address] [place] [id number]  Via Fax: [phone]          Daily Note     Today's date: 2024  Patient name: Karen Gambino  : 1950  MRN: [id number]  Referring provider: Reed Mckinney MD  Dx:   Encounter Diagnosis     ICD-10-CM    1. Sacroiliac joint dysfunction  M53.3       2. Lumbar radiculopathy  M54.16                      Subjective: Patient reports feeling fairly good today.      Objective: See treatment diary below      Assessment: Tolerated treatment well without complaint. Patient required moderate verbal cues to maintain proper core contraction through bird dogs. Patient demonstrated fatigue post treatment.      Plan: Continue per plan of care.  See PT's re-evaluation.     Precautions: none     Daily Treatment Diary:      Initial Evaluation Date: 24  Compliance 3/18  3/21  3/25  3/28  4/5/24 4/8  4/11  4/15  4/18  4/22   Visit Number 1 2  3  4  5  6  7  8  9  10  "  Re-Eval  IE                CD   Foto Captured Y           Y                3/18  3/21  3/25  3/28  4/5/24  4/8  4/11  4/15  4/18 4/22   Manual                      TPR - R piriformis/ glut med  -->  15 min  15 min  12 min  15 min 15min 15 min 15 min 15 min  10 m                                               Ther-Ex                      Warm up   Heat below          Piriformis stretch 30\" x3 30\"x3  30\"x3  30\"x3  30\"x3  30\"x3  30\"x3   30\"x3  30\"x3 30\"x3   Sktc- no towel 10\" x3 10\"x5  10\"x5  10\"x5  10\"x5  5\"x10  5\"x10  5\"x10  5\"x10  5\"x10   LTR --> 5\"x10  5\"x10  5\"X10  5\"10  5\"x10  5\"x10 5\"x10  5\"x10  5\"x10   HS stretch 30\" x3  30\"x3 strap  30\"x3 strap  30\"x3 strap  30'x3 strap 30'x3 strap   30\"x3 strap  30\"x3 strap  30\"x3 strap  30\"x3 strap   Stand QL stretch in doorway                   10\"x3 ea                         Seated flexion ball roll outs- 3 dir   5\"x5 ea  5\"x5 ea  5\"x10 ea  5\"x10  5\"x10  5\"x10  5\"x10  5\"x10  nv                         NuStep      L1 5 min  L1 5 min L3 7 min  L3 10 min L3 10 min  L3 10 min  L3 10 min                         Neuro Re-Ed                      TA setting 5\"x10  5\"x10  5\"x10  5\"x15  5\"x15  5\"x20  5\"x20  5\"x20  -     TA c hip abd  5\"x10 OTB 5\"x15 GTB 5\"X15 GTB 5\"x15 GTB GTB 5\"x20 BTB 5\"x20 BTB 5\"x20 BTB 5\"x20 BTB 5\"x20   TA c hip add  5\"x10 5\"x15 5\"x15 5\"x15 5\"x20 5\"X20 5\"x20 5\"x20 5\"x20   TA c march  10x 15x 15x 15x 20x 20x 20x 20x 20x   Dead bug       10x 10x 10x 10x   Bird dog       8x 10x 10x 10x   TA mini squat         15x 15x    TA stand hip abd         15x ea 15x ea                             Ther-Act                                                               Modalities                      HP prone  10 min 10 min pre 10 min pre 10 min post 10 min pre 10 min post Seated 10 min post def def def                                           Access Code: [email or web address]  URL: [email or web address]  Date: 03/18/2024  Prepared by: Tracey Elliott    Exercises  - " Hooklying Single Knee to Chest Stretch  - 1 x daily - 7 x weekly - 1 sets - 3 reps - 10 hold  - Supine Piriformis Stretch with Foot on Ground  - 1 x daily - 7 x weekly - 1 sets - 3 reps - 30 hold  - Seated Hamstring Stretch  - 1 x daily - 7 x weekly - 1 sets - 3 reps - 30 hold  - Supine Transversus Abdominis Bracing - Hands on Ground  - 1 x daily - 7 x weekly - 1 sets - 10 reps - 3-5 hold                           Attestation signed by Tracey Elliott PT at 2024 12:47 PM:  I supervised the visit.  We discussed the case to ensure appropriate continuation and progression of care and I reviewed the documentation.     PT Evaluation     Today's date: 2024  Patient name: Karen Gambino  : 1950  MRN: [id number]  Referring provider: Reed Mckinney MD  Dx:   Encounter Diagnosis     ICD-10-CM    1. Sacroiliac joint dysfunction  M53.3       2. Lumbar radiculopathy  M54.16           Start Time: 1100  Stop Time: 1200  Total time in clinic (min): 60 minutes    Assessment  Assessment details: Patient is a 73 y.o. woman who presents to PT with R LBP that radiates to the level of the knee. Patient reports tenderness with palpation to the right piriformis, SI joint and gluteus medius. Patient is able to ambulate with short step length and limited trunk rotation/ arm swing. Patient has limited trunk flexion ROM. Patient is expected to respond well to PT intervention to decrease pain and improve function.     UPDATE 24  Patient experiences increased right buttock aching with right lateral flexion and right trunk rotation. Flexion remains mildly limited and painful. Patient is independent with her HEP and feels confident with its use however does not feel that it improves her pain symptoms. She does follow up with Dr. Mckinney tomorrow for further evaluation and care. She does future PT sessions scheduled and will continue PT as indicated.   Impairments: abnormal or restricted ROM, lacks appropriate home  exercise program and pain with function    Symptom irritability: highUnderstanding of Dx/Px/POC: excellent  Goals  STG - 4 weeks  Patient able to demonstrate increased L/S AROM into flexion for pain free ROM.  Patient to report pain at worst 2/10 with daily activity.  Patient to have increased flexibility of right piriformis by 25 %.  Patient to be independent with HEP.    LTG - 8 weeks  Patient able to demonstrate painfree AROM fo the L/S.  Patient able to demonstrate good posture in sitting and standing.  Patient able to demonstrate proper body mechanics for lifting and carrying items.  Patient able to ambulate with normalized gait pattern.   Patient to demonstrate core strength at  4+/5 .  Patient to be independent with final HEP.      Plan  Plan details: Patient's evaluation is completed. Patient was instructed with a HEP this date. Patient has scheduled further PT sessions for treatment.    Patient would benefit from: PT eval and skilled physical therapy  Planned modality interventions: TENS, thermotherapy: hydrocollator packs, electrical stimulation/Russian stimulation and cryotherapy  Planned therapy interventions: manual therapy, neuromuscular re-education, therapeutic exercise, therapeutic training and home exercise program  Frequency: 2x week  Duration in weeks: 4  Plan of Care beginning date: 4/22/2024  Plan of Care expiration date: 5/20/2024  Treatment plan discussed with: patient      Subjective Evaluation    History of Present Illness  Mechanism of injury: Patient notes that she has trouble at the right SI joint/ buttock with radiation/ tingling to the knee. She notes that she has had symptoms for about 2 months. She notes no prior hx of this type of symptoms. She notes she had an injection for tail bone pain that was helpful in November 2023. She notes difficulty laying in bed. Symptoms increase with sitting. She notes that she feels better with standing/ walking.     UPDATE 4/22/24  Patient notes  that right buttock dull aching persists daily at 5-6/10. She notes intensity of symptoms does increase at times when sitting for a long period of time.  Patient Goals  Patient goals for therapy: decreased pain    Pain  Current pain ratin  At best pain ratin  At worst pain ratin  Quality: dull ache  Relieving factors: change in position  Aggravating factors: sitting  Progression: no change    Social Support  Lives in: multiple-level home    Employment status: working ( as needed - sitting/ walking)  Treatments  Previous treatment: physical therapy (for the neck)      Objective     Postural Observations  Seated posture: fair  Standing posture: fair      Palpation   Left   Hypertonic in the erector spinae.   Tenderness of the lumbar paraspinals and quadratus lumborum.     Right   Hypertonic in the erector spinae.   Tenderness of the lumbar paraspinals and quadratus lumborum.     Additional Palpation Details  R and L SI joint are reactive to palpation as well at the R piriformis and Gluteus medius    Neurological Testing     Sensation     Lumbar   Left   Intact: light touch    Right   Intact: light touch    Reflexes   Left   Patellar (L4): normal (2+)    Right   Patellar (L4): normal (2+)    Active Range of Motion     Lumbar   Flexion:  with pain Restriction level: minimal  Extension:  WFL  Left lateral flexion:  with pain Restriction level: minimal  Right lateral flexion:  Restriction level: minimal  Left rotation:  Restriction level: minimal  Right rotation:  Restriction level: minimal    Additional Active Range of Motion Details  24 - Patient experiences increased right buttock aching with right lateral flexion and right trunk rotation. Flexion remains mildly limited and painful.      Joint Play     Hypomobile: L3, L4 and L5     Pain: L3, L4 and L5     Strength/Myotome Testing     Left Hip   Planes of Motion   Flexion: 4+  Extension: 4+  Abduction: 4+  Adduction: 4+  External rotation:  4  Internal rotation: 4    Right Hip   Planes of Motion   Flexion: 4  Extension: 4+  Abduction: 4+  Adduction: 4+  External rotation: 4  Internal rotation: 4    Left Knee   Flexion: 4+  Extension: 4+    Right Knee   Flexion: 4+  Extension: 4+    Left Ankle/Foot   Dorsiflexion: 4+  Plantar flexion: 4+    Right Ankle/Foot   Dorsiflexion: 4+  Plantar flexion: 4+    Functional Assessment      Squat    Trunk lean left.                    Access Code: [email or web address]  URL: [email or web address]  Date: 03/18/2024  Prepared by: Tracey Elliott    Exercises  - Hooklying Single Knee to Chest Stretch  - 1 x daily - 7 x weekly - 1 sets - 3 reps - 10 hold  - Supine Piriformis Stretch with Foot on Ground  - 1 x daily - 7 x weekly - 1 sets - 3 reps - 30 hold  - Seated Hamstring Stretch  - 1 x daily - 7 x weekly - 1 sets - 3 reps - 30 hold  - Supine Transversus Abdominis Bracing - Hands on Ground  - 1 x daily - 7 x weekly - 1 sets - 10 reps - 3-5 hold

## 2024-04-22 NOTE — PROGRESS NOTES
PT Evaluation     Today's date: 2024  Patient name: Karen Gambino  : 1950  MRN: [id number]  Referring provider: Reed Mckinney MD  Dx:   Encounter Diagnosis     ICD-10-CM    1. Sacroiliac joint dysfunction  M53.3       2. Lumbar radiculopathy  M54.16           Start Time: 1100  Stop Time: 1200  Total time in clinic (min): 60 minutes    Assessment  Assessment details: Patient is a 73 y.o. woman who presents to PT with R LBP that radiates to the level of the knee. Patient reports tenderness with palpation to the right piriformis, SI joint and gluteus medius. Patient is able to ambulate with short step length and limited trunk rotation/ arm swing. Patient has limited trunk flexion ROM. Patient is expected to respond well to PT intervention to decrease pain and improve function.     UPDATE 24  Patient experiences increased right buttock aching with right lateral flexion and right trunk rotation. Flexion remains mildly limited and painful. Patient is independent with her HEP and feels confident with its use however does not feel that it improves her pain symptoms. She does follow up with Dr. Mckinney tomorrow for further evaluation and care. She does future PT sessions scheduled and will continue PT as indicated.   Impairments: abnormal or restricted ROM, lacks appropriate home exercise program and pain with function    Symptom irritability: highUnderstanding of Dx/Px/POC: excellent  Goals  STG - 4 weeks  Patient able to demonstrate increased L/S AROM into flexion for pain free ROM.  Patient to report pain at worst 2/10 with daily activity.  Patient to have increased flexibility of right piriformis by 25 %.  Patient to be independent with HEP.    LTG - 8 weeks  Patient able to demonstrate painfree AROM fo the L/S.  Patient able to demonstrate good posture in sitting and standing.  Patient able to demonstrate proper body mechanics for lifting and carrying items.  Patient able to ambulate with normalized  gait pattern.   Patient to demonstrate core strength at  4+/5 .  Patient to be independent with final HEP.      Plan  Plan details: Patient's evaluation is completed. Patient was instructed with a HEP this date. Patient has scheduled further PT sessions for treatment.    Patient would benefit from: PT eval and skilled physical therapy  Planned modality interventions: TENS, thermotherapy: hydrocollator packs, electrical stimulation/Russian stimulation and cryotherapy  Planned therapy interventions: manual therapy, neuromuscular re-education, therapeutic exercise, therapeutic training and home exercise program  Frequency: 2x week  Duration in weeks: 4  Plan of Care beginning date: 2024  Plan of Care expiration date: 2024  Treatment plan discussed with: patient      Subjective Evaluation    History of Present Illness  Mechanism of injury: Patient notes that she has trouble at the right SI joint/ buttock with radiation/ tingling to the knee. She notes that she has had symptoms for about 2 months. She notes no prior hx of this type of symptoms. She notes she had an injection for tail bone pain that was helpful in 2023. She notes difficulty laying in bed. Symptoms increase with sitting. She notes that she feels better with standing/ walking.     UPDATE 24  Patient notes that right buttock dull aching persists daily at 5-6/10. She notes intensity of symptoms does increase at times when sitting for a long period of time.  Patient Goals  Patient goals for therapy: decreased pain    Pain  Current pain ratin  At best pain ratin  At worst pain ratin  Quality: dull ache  Relieving factors: change in position  Aggravating factors: sitting  Progression: no change    Social Support  Lives in: multiple-level home    Employment status: working ( as needed - sitting/ walking)  Treatments  Previous treatment: physical therapy (for the neck)      Objective     Postural  Observations  Seated posture: fair  Standing posture: fair      Palpation   Left   Hypertonic in the erector spinae.   Tenderness of the lumbar paraspinals and quadratus lumborum.     Right   Hypertonic in the erector spinae.   Tenderness of the lumbar paraspinals and quadratus lumborum.     Additional Palpation Details  R and L SI joint are reactive to palpation as well at the R piriformis and Gluteus medius    Neurological Testing     Sensation     Lumbar   Left   Intact: light touch    Right   Intact: light touch    Reflexes   Left   Patellar (L4): normal (2+)    Right   Patellar (L4): normal (2+)    Active Range of Motion     Lumbar   Flexion:  with pain Restriction level: minimal  Extension:  WFL  Left lateral flexion:  with pain Restriction level: minimal  Right lateral flexion:  Restriction level: minimal  Left rotation:  Restriction level: minimal  Right rotation:  Restriction level: minimal    Additional Active Range of Motion Details  4/22/24 - Patient experiences increased right buttock aching with right lateral flexion and right trunk rotation. Flexion remains mildly limited and painful.      Joint Play     Hypomobile: L3, L4 and L5     Pain: L3, L4 and L5     Strength/Myotome Testing     Left Hip   Planes of Motion   Flexion: 4+  Extension: 4+  Abduction: 4+  Adduction: 4+  External rotation: 4  Internal rotation: 4    Right Hip   Planes of Motion   Flexion: 4  Extension: 4+  Abduction: 4+  Adduction: 4+  External rotation: 4  Internal rotation: 4    Left Knee   Flexion: 4+  Extension: 4+    Right Knee   Flexion: 4+  Extension: 4+    Left Ankle/Foot   Dorsiflexion: 4+  Plantar flexion: 4+    Right Ankle/Foot   Dorsiflexion: 4+  Plantar flexion: 4+    Functional Assessment      Squat    Trunk lean left.                    Access Code: [email or web address]  URL: [email or web address]  Date: 03/18/2024  Prepared by: Tracey Elliott    Exercises  - Hooklying Single Knee to Chest Stretch  - 1 x daily -  7 x weekly - 1 sets - 3 reps - 10 hold  - Supine Piriformis Stretch with Foot on Ground  - 1 x daily - 7 x weekly - 1 sets - 3 reps - 30 hold  - Seated Hamstring Stretch  - 1 x daily - 7 x weekly - 1 sets - 3 reps - 30 hold  - Supine Transversus Abdominis Bracing - Hands on Ground  - 1 x daily - 7 x weekly - 1 sets - 10 reps - 3-5 hold

## 2024-04-22 NOTE — PROGRESS NOTES
"Daily Note     Today's date: 2024  Patient name: Karen Gambino  : 1950  MRN: [id number]  Referring provider: Reed Mckinnye MD  Dx:   Encounter Diagnosis     ICD-10-CM    1. Sacroiliac joint dysfunction  M53.3       2. Lumbar radiculopathy  M54.16                      Subjective: Patient reports feeling fairly good today.      Objective: See treatment diary below      Assessment: Tolerated treatment well without complaint. Patient required moderate verbal cues to maintain proper core contraction through bird dogs. Patient demonstrated fatigue post treatment.      Plan: Continue per plan of care.  See PT's re-evaluation.     Precautions: none     Daily Treatment Diary:      Initial Evaluation Date: 24  Compliance 3/18  3/21  3/25  3/28  4/5/24 4/8  4/11  4/15  4/18  4/22   Visit Number 1 2  3  4  5  6  7  8  9  10   Re-Eval  IE                CD   Foto Captured Y           Y                3/18  3/21  3/25  3/28  4/5/24  4/8  4/11  4/15  4/18 4/22   Manual                      TPR - R piriformis/ glut med  -->  15 min  15 min  12 min  15 min 15min 15 min 15 min 15 min  10 m                                               Ther-Ex                      Warm up   Heat below          Piriformis stretch 30\" x3 30\"x3  30\"x3  30\"x3  30\"x3  30\"x3  30\"x3   30\"x3  30\"x3 30\"x3   Sktc- no towel 10\" x3 10\"x5  10\"x5  10\"x5  10\"x5  5\"x10  5\"x10  5\"x10  5\"x10  5\"x10   LTR --> 5\"x10  5\"x10  5\"X10  5\"10  5\"x10  5\"x10 5\"x10  5\"x10  5\"x10   HS stretch 30\" x3  30\"x3 strap  30\"x3 strap  30\"x3 strap  30'x3 strap 30'x3 strap   30\"x3 strap  30\"x3 strap  30\"x3 strap  30\"x3 strap   Stand QL stretch in doorway                   10\"x3 ea                         Seated flexion ball roll outs- 3 dir   5\"x5 ea  5\"x5 ea  5\"x10 ea  5\"x10  5\"x10  5\"x10  5\"x10  5\"x10  nv                         NuStep      L1 5 min  L1 5 min L3 7 min  L3 10 min L3 10 min  L3 10 min  L3 10 min                         Neuro Re-Ed                      TA " "setting 5\"x10  5\"x10  5\"x10  5\"x15  5\"x15  5\"x20  5\"x20  5\"x20  -     TA c hip abd  5\"x10 OTB 5\"x15 GTB 5\"X15 GTB 5\"x15 GTB GTB 5\"x20 BTB 5\"x20 BTB 5\"x20 BTB 5\"x20 BTB 5\"x20   TA c hip add  5\"x10 5\"x15 5\"x15 5\"x15 5\"x20 5\"X20 5\"x20 5\"x20 5\"x20   TA c march  10x 15x 15x 15x 20x 20x 20x 20x 20x   Dead bug       10x 10x 10x 10x   Bird dog       8x 10x 10x 10x   TA mini squat         15x 15x    TA stand hip abd         15x ea 15x ea                             Ther-Act                                                               Modalities                      HP prone  10 min 10 min pre 10 min pre 10 min post 10 min pre 10 min post Seated 10 min post def def def                                           Access Code: [email or web address]  URL: [email or web address]  Date: 03/18/2024  Prepared by: Tracey Elliott    Exercises  - Hooklying Single Knee to Chest Stretch  - 1 x daily - 7 x weekly - 1 sets - 3 reps - 10 hold  - Supine Piriformis Stretch with Foot on Ground  - 1 x daily - 7 x weekly - 1 sets - 3 reps - 30 hold  - Seated Hamstring Stretch  - 1 x daily - 7 x weekly - 1 sets - 3 reps - 30 hold  - Supine Transversus Abdominis Bracing - Hands on Ground  - 1 x daily - 7 x weekly - 1 sets - 10 reps - 3-5 hold                         "

## 2024-04-24 ENCOUNTER — APPOINTMENT (OUTPATIENT)
Dept: PHYSICAL THERAPY | Facility: CLINIC | Age: 74
End: 2024-04-24
Payer: MEDICARE

## 2025-02-25 ENCOUNTER — TELEPHONE (OUTPATIENT)
Age: 75
End: 2025-02-25

## 2025-02-25 NOTE — TELEPHONE ENCOUNTER
New Patient    Appointment Scheduling  What office location does the patient prefer?:   What is the reason for the patient's appointment?: recurring uti's   Have patient records been requested?: Yes   If No, are the records showing in Epic: Records in epic     Appointment Details   Date: 4/14/25  Time:   1:40 pm    Location:  [facility]    Provider: Subha     Does the appointment need further review? (Reason)      HISTORY  Is the patient having active symptoms? If so, describe symptoms: Recurring uti's almost every few months   Has the patient had any previous Urologist(s)?: Unsure   Was the patient seen in the ED?: No   Has the patient had any outside testing done?: Nothing recent  Does patient have Imaging/Lab Results: Nothing recent   Does the patient have a personal history of any cancer?: No     INSURANCE   Have you confirmed Patient's insurance? Yes to all three   Is the insurance accepted?    Is the insurance active?

## 2025-04-04 PROBLEM — N39.0 RECURRENT UTI: Status: ACTIVE | Noted: 2025-04-04

## 2025-04-04 NOTE — PROGRESS NOTES
Name: Karen Gambino      : 1950      MRN: [id number]  Encounter Provider: ALVARADO Keene  Encounter Date: 2025   Encounter department: [facility] [place]  :  Assessment & Plan  Recurrent UTI  Multiple outside urine cultures with e.coli  POCT- nitrite positive   PVR 0mL  Adequate hydration with water  Bowel regimen to avoid constipation diarrhea  Timed void, double void  Macrobid x 90day   F/u 4 mo      Orders:    POCT urine dip auto non-scope    nitrofurantoin (MACROBID) 100 mg capsule; Take 1 capsule (100 mg total) by mouth in the morning    POCT Measure PVR    Discussed options for management including increasing hydration by increasing water intake to dilute the urine.  Avoid bldder irritants such as caffeine, alcohol, spicy foods and acidic foods.This can reduce irritation.  Control of bowel habits with avoidance of constipation and diarrhea, appropriate bladder and sexual  hygiene, urinating when you feel the urge, timed void, double void to facilitate adequate emptying, adding 100% cranberry juice or cranberry supplement tabs, adding D-mannose, cystex urinary health maintenance supplement avoiding irritants that can facilitate irritation, adequate glycemic control if diabetic, and suppressive antibiotic use.   Employ OTC pain relievers such as acetaminophen. Consider for Pyridium to numb the urinary tract lining and provide temporary limited relief.  Pelvic floor physical therapy can also be beneficial as well as timed void, double void.        Patient will hydrate well, void when feeling the urge, add bowel regimen to avoid constipation and diarrhea and add macrobid for 90 days. We will send urien for culture. We will call if we need to adjust therapy. She will speak up if any signs of uti after completion of prophylaxis. All questions were answered, she understands and agrees with the plan.  She will return in 4 months.              Nocturia  Limit beverages  throughout the night.  Timed void, double void  Elevate feet prior to bed  Adequate hydration  Orders:    trospium chloride (SANCTURA) 20 mg tablet; Take 1 tablet (20 mg total) by mouth 2 (two) times a day    POCT Measure PVR        History of Present Illness   Karen Gambino is a 74 y.o. female new patient who presents complaints of recurrent UTI.  Outside chart review notable for nitrite positive  with >100K e. Coli.  Sensitivity macrobid.  No PMH on file.  Has never seen a urologist in the past.  Has never had kidney stones or prior urologic surgery.  She is a never smoker.  States she drinks 3 glasses of water.  Does not drink coffee or tea. Coca cola at lunch.  Bowels regular.  No issues with diarrhea, constipation or nausea.  Creatinine 0.9 in April 2025    Does have dysuria at this time.  Urine dip nitrite positive.  Also has nocturia x 2-5 times each night. Goes to bed at 10pm stops drinking 7 pm.  Denies hematuria.  No incontinence.  Does drink throughout the night on occasion. No fever, chills flank pain.  No issues with incontinence or urgency.              Review of Systems   Constitutional: Negative.  Negative for chills and fever.   HENT: Negative.     Eyes: Negative.    Respiratory: Negative.  Negative for chest tightness and shortness of breath.    Cardiovascular: Negative.    Gastrointestinal: Negative.  Negative for abdominal distention, constipation, diarrhea, nausea and vomiting.   Endocrine: Negative.    Genitourinary:  Positive for dysuria. Negative for difficulty urinating, flank pain, frequency, hematuria and urgency.   Musculoskeletal:  Negative for back pain and neck pain.   Skin: Negative.    Neurological: Negative.  Negative for dizziness and headaches.   Psychiatric/Behavioral: Negative.  Negative for agitation and behavioral problems.           Objective   There were no vitals taken for this visit.    Physical Exam  Vitals reviewed.   Constitutional:       General: She is not in acute  "distress.  HENT:      Head: Normocephalic and atraumatic.      Right Ear: External ear normal.      Left Ear: External ear normal.      Nose: Nose normal.   Eyes:      Extraocular Movements: Extraocular movements intact.      Pupils: Pupils are equal, round, and reactive to light.   Cardiovascular:      Rate and Rhythm: Normal rate.   Pulmonary:      Effort: Pulmonary effort is normal. No respiratory distress.   Abdominal:      Palpations: Abdomen is soft.      Tenderness: There is no right CVA tenderness, left CVA tenderness or guarding.   Musculoskeletal:         General: Normal range of motion.      Cervical back: Normal range of motion and neck supple.   Skin:     General: Skin is warm and dry.   Neurological:      General: No focal deficit present.      Mental Status: She is alert and oriented to person, place, and time. Mental status is at baseline.   Psychiatric:         Mood and Affect: Mood normal.         Behavior: Behavior normal.         Thought Content: Thought content normal.         Judgment: Judgment normal.          Results   No results found for: \"PSA\"  Lab Results   Component Value Date    CALCIUM 9.4 12/18/2020    K 4.5 12/18/2020    CO2 30 12/18/2020     12/18/2020    BUN 18 12/18/2020    CREATININE 0.90 12/18/2020     No results found for: \"WBC\", \"HGB\", \"HCT\", \"MCV\", \"PLT\"    Office Urine Dip  No results found for this or any previous visit (from the past hour).      "

## 2025-04-04 NOTE — PATIENT INSTRUCTIONS
Recommended   Good fluid hydration  control of bowel habits with avoidance of constipation and diarrhea  reviewed appropriate bladder hygiene  100% cranberry juice or cranberry supplement tabs  Take daily pro- or pre- biotics  Timed void, double void  AZO over the counter for any burning   [facility]...

## 2025-04-04 NOTE — ASSESSMENT & PLAN NOTE
Multiple outside urine cultures with e.coli  POCT- nitrite positive   PVR 0mL  Adequate hydration with water  Bowel regimen to avoid constipation diarrhea  Timed void, double void  Macrobid x 90day   F/u 4 mo      Orders:    POCT urine dip auto non-scope    nitrofurantoin (MACROBID) 100 mg capsule; Take 1 capsule (100 mg total) by mouth in the morning    POCT Measure PVR    Discussed options for management including increasing hydration by increasing water intake to dilute the urine.  Avoid bldder irritants such as caffeine, alcohol, spicy foods and acidic foods.This can reduce irritation.  Control of bowel habits with avoidance of constipation and diarrhea, appropriate bladder and sexual  hygiene, urinating when you feel the urge, timed void, double void to facilitate adequate emptying, adding 100% cranberry juice or cranberry supplement tabs, adding D-mannose, cystex urinary health maintenance supplement avoiding irritants that can facilitate irritation, adequate glycemic control if diabetic, and suppressive antibiotic use.   Employ OTC pain relievers such as acetaminophen. Consider for Pyridium to numb the urinary tract lining and provide temporary limited relief.  Pelvic floor physical therapy can also be beneficial as well as timed void, double void.        Patient will hydrate well, void when feeling the urge, add bowel regimen to avoid constipation and diarrhea and add macrobid for 90 days. We will send urien for culture. We will call if we need to adjust therapy. She will speak up if any signs of uti after completion of prophylaxis. All questions were answered, she understands and agrees with the plan.  She will return in 4 months.

## 2025-04-14 ENCOUNTER — OFFICE VISIT (OUTPATIENT)
Dept: UROLOGY | Facility: CLINIC | Age: 75
End: 2025-04-14
Payer: MEDICARE

## 2025-04-14 VITALS
DIASTOLIC BLOOD PRESSURE: 78 MMHG | SYSTOLIC BLOOD PRESSURE: 118 MMHG | TEMPERATURE: 97.6 F | WEIGHT: 138 LBS | HEART RATE: 87 BPM | OXYGEN SATURATION: 97 % | HEIGHT: 60 IN | BODY MASS INDEX: 27.09 KG/M2

## 2025-04-14 DIAGNOSIS — R35.1 NOCTURIA: ICD-10-CM

## 2025-04-14 DIAGNOSIS — N39.0 RECURRENT UTI: Primary | ICD-10-CM

## 2025-04-14 LAB
POST-VOID RESIDUAL VOLUME, ML POC: 0 ML
SL AMB  POCT GLUCOSE, UA: ABNORMAL
SL AMB LEUKOCYTE ESTERASE,UA: ABNORMAL
SL AMB POCT BILIRUBIN,UA: ABNORMAL
SL AMB POCT BLOOD,UA: ABNORMAL
SL AMB POCT CLARITY,UA: ABNORMAL
SL AMB POCT COLOR,UA: YELLOW
SL AMB POCT KETONES,UA: ABNORMAL
SL AMB POCT NITRITE,UA: POSITIVE
SL AMB POCT PH,UA: 5.5
SL AMB POCT SPECIFIC GRAVITY,UA: ABNORMAL
SL AMB POCT URINE PROTEIN: ABNORMAL
SL AMB POCT UROBILINOGEN: 0.2

## 2025-04-14 PROCEDURE — 87077 CULTURE AEROBIC IDENTIFY: CPT

## 2025-04-14 PROCEDURE — 87086 URINE CULTURE/COLONY COUNT: CPT

## 2025-04-14 PROCEDURE — 51798 US URINE CAPACITY MEASURE: CPT

## 2025-04-14 PROCEDURE — 81003 URINALYSIS AUTO W/O SCOPE: CPT

## 2025-04-14 PROCEDURE — 87186 SC STD MICRODIL/AGAR DIL: CPT

## 2025-04-14 PROCEDURE — 99204 OFFICE O/P NEW MOD 45 MIN: CPT

## 2025-04-14 RX ORDER — ESCITALOPRAM OXALATE 10 MG/1
10 TABLET ORAL DAILY
COMMUNITY
Start: 2025-02-10

## 2025-04-14 RX ORDER — SIMVASTATIN 20 MG
10 TABLET ORAL
COMMUNITY
Start: 2025-01-30

## 2025-04-14 RX ORDER — FLUTICASONE PROPIONATE 50 MCG
2 SPRAY, SUSPENSION (ML) NASAL DAILY
COMMUNITY

## 2025-04-14 RX ORDER — ALENDRONATE SODIUM 70 MG/1
35 TABLET ORAL
COMMUNITY

## 2025-04-14 RX ORDER — OMEPRAZOLE 20 MG/1
20 CAPSULE, DELAYED RELEASE ORAL DAILY
COMMUNITY

## 2025-04-14 RX ORDER — ASPIRIN 81 MG/1
81 TABLET ORAL DAILY
COMMUNITY

## 2025-04-14 RX ORDER — TROSPIUM CHLORIDE 20 MG/1
20 TABLET, FILM COATED ORAL 2 TIMES DAILY
Qty: 180 TABLET | Refills: 3 | Status: SHIPPED | OUTPATIENT
Start: 2025-04-14

## 2025-04-14 RX ORDER — NITROFURANTOIN 25; 75 MG/1; MG/1
100 CAPSULE ORAL DAILY
Qty: 90 CAPSULE | Refills: 0 | Status: SHIPPED | OUTPATIENT
Start: 2025-04-14

## 2025-04-16 ENCOUNTER — RESULTS FOLLOW-UP (OUTPATIENT)
Dept: OTHER | Facility: HOSPITAL | Age: 75
End: 2025-04-16

## 2025-04-16 LAB — BACTERIA UR CULT: ABNORMAL

## 2025-05-04 PROBLEM — N39.0 RECURRENT UTI: Status: RESOLVED | Noted: 2025-04-04 | Resolved: 2025-05-04

## 2025-08-05 PROBLEM — R35.1 NOCTURIA: Status: ACTIVE | Noted: 2025-08-05

## 2025-08-14 ENCOUNTER — OFFICE VISIT (OUTPATIENT)
Dept: UROLOGY | Facility: CLINIC | Age: 75
End: 2025-08-14

## 2025-08-18 ENCOUNTER — TELEPHONE (OUTPATIENT)
Dept: UROLOGY | Facility: CLINIC | Age: 75
End: 2025-08-18

## 2025-08-18 ENCOUNTER — RESULTS FOLLOW-UP (OUTPATIENT)
Dept: UROLOGY | Facility: CLINIC | Age: 75
End: 2025-08-18

## 2025-08-18 DIAGNOSIS — N39.0 URINARY TRACT INFECTION WITHOUT HEMATURIA, SITE UNSPECIFIED: Primary | ICD-10-CM

## 2025-08-18 RX ORDER — SULFAMETHOXAZOLE AND TRIMETHOPRIM 800; 160 MG/1; MG/1
1 TABLET ORAL EVERY 12 HOURS SCHEDULED
Qty: 10 TABLET | Refills: 0 | Status: SHIPPED | OUTPATIENT
Start: 2025-08-18 | End: 2025-08-23